# Patient Record
Sex: FEMALE | Race: ASIAN | URBAN - METROPOLITAN AREA
[De-identification: names, ages, dates, MRNs, and addresses within clinical notes are randomized per-mention and may not be internally consistent; named-entity substitution may affect disease eponyms.]

---

## 2017-04-08 ENCOUNTER — EMERGENCY (EMERGENCY)
Facility: HOSPITAL | Age: 37
LOS: 1 days | Discharge: HOME | End: 2017-04-08
Attending: PERSONAL EMERGENCY RESPONSE ATTENDANT | Admitting: PERSONAL EMERGENCY RESPONSE ATTENDANT
Payer: COMMERCIAL

## 2017-04-08 VITALS
HEART RATE: 74 BPM | RESPIRATION RATE: 18 BRPM | OXYGEN SATURATION: 100 % | DIASTOLIC BLOOD PRESSURE: 84 MMHG | SYSTOLIC BLOOD PRESSURE: 143 MMHG | TEMPERATURE: 98 F

## 2017-04-08 DIAGNOSIS — Z98.891 HISTORY OF UTERINE SCAR FROM PREVIOUS SURGERY: Chronic | ICD-10-CM

## 2017-04-08 DIAGNOSIS — R51 HEADACHE: ICD-10-CM

## 2017-04-08 DIAGNOSIS — Z98.890 OTHER SPECIFIED POSTPROCEDURAL STATES: ICD-10-CM

## 2017-04-08 LAB
ALBUMIN SERPL ELPH-MCNC: 4.7 G/DL — SIGNIFICANT CHANGE UP (ref 3.3–5)
ALP SERPL-CCNC: 51 U/L — SIGNIFICANT CHANGE UP (ref 40–120)
ALT FLD-CCNC: 15 U/L RC — SIGNIFICANT CHANGE UP (ref 10–45)
ANION GAP SERPL CALC-SCNC: 15 MMOL/L — SIGNIFICANT CHANGE UP (ref 5–17)
APTT BLD: 36.1 SEC — SIGNIFICANT CHANGE UP (ref 27.5–37.4)
AST SERPL-CCNC: 17 U/L — SIGNIFICANT CHANGE UP (ref 10–40)
BASOPHILS # BLD AUTO: 0.1 K/UL — SIGNIFICANT CHANGE UP (ref 0–0.2)
BASOPHILS NFR BLD AUTO: 0.8 % — SIGNIFICANT CHANGE UP (ref 0–2)
BILIRUB SERPL-MCNC: 0.4 MG/DL — SIGNIFICANT CHANGE UP (ref 0.2–1.2)
BUN SERPL-MCNC: 11 MG/DL — SIGNIFICANT CHANGE UP (ref 7–23)
CALCIUM SERPL-MCNC: 9.4 MG/DL — SIGNIFICANT CHANGE UP (ref 8.4–10.5)
CHLORIDE SERPL-SCNC: 104 MMOL/L — SIGNIFICANT CHANGE UP (ref 96–108)
CO2 SERPL-SCNC: 24 MMOL/L — SIGNIFICANT CHANGE UP (ref 22–31)
CREAT SERPL-MCNC: 0.84 MG/DL — SIGNIFICANT CHANGE UP (ref 0.5–1.3)
EOSINOPHIL # BLD AUTO: 0.1 K/UL — SIGNIFICANT CHANGE UP (ref 0–0.5)
EOSINOPHIL NFR BLD AUTO: 1.1 % — SIGNIFICANT CHANGE UP (ref 0–6)
GLUCOSE SERPL-MCNC: 57 MG/DL — LOW (ref 70–99)
HCT VFR BLD CALC: 38.4 % — SIGNIFICANT CHANGE UP (ref 34.5–45)
HGB BLD-MCNC: 12.9 G/DL — SIGNIFICANT CHANGE UP (ref 11.5–15.5)
INR BLD: 1.03 RATIO — SIGNIFICANT CHANGE UP (ref 0.88–1.16)
LYMPHOCYTES # BLD AUTO: 1.4 K/UL — SIGNIFICANT CHANGE UP (ref 1–3.3)
LYMPHOCYTES # BLD AUTO: 16.4 % — SIGNIFICANT CHANGE UP (ref 13–44)
MCHC RBC-ENTMCNC: 29.2 PG — SIGNIFICANT CHANGE UP (ref 27–34)
MCHC RBC-ENTMCNC: 33.5 GM/DL — SIGNIFICANT CHANGE UP (ref 32–36)
MCV RBC AUTO: 87.1 FL — SIGNIFICANT CHANGE UP (ref 80–100)
MONOCYTES # BLD AUTO: 0.4 K/UL — SIGNIFICANT CHANGE UP (ref 0–0.9)
MONOCYTES NFR BLD AUTO: 4.1 % — SIGNIFICANT CHANGE UP (ref 2–14)
NEUTROPHILS # BLD AUTO: 6.8 K/UL — SIGNIFICANT CHANGE UP (ref 1.8–7.4)
NEUTROPHILS NFR BLD AUTO: 77.6 % — HIGH (ref 43–77)
PLATELET # BLD AUTO: 289 K/UL — SIGNIFICANT CHANGE UP (ref 150–400)
POTASSIUM SERPL-MCNC: 4.1 MMOL/L — SIGNIFICANT CHANGE UP (ref 3.5–5.3)
POTASSIUM SERPL-SCNC: 4.1 MMOL/L — SIGNIFICANT CHANGE UP (ref 3.5–5.3)
PROT SERPL-MCNC: 7.7 G/DL — SIGNIFICANT CHANGE UP (ref 6–8.3)
PROTHROM AB SERPL-ACNC: 11.2 SEC — SIGNIFICANT CHANGE UP (ref 9.8–12.7)
RBC # BLD: 4.42 M/UL — SIGNIFICANT CHANGE UP (ref 3.8–5.2)
RBC # FLD: 12.2 % — SIGNIFICANT CHANGE UP (ref 10.3–14.5)
SODIUM SERPL-SCNC: 143 MMOL/L — SIGNIFICANT CHANGE UP (ref 135–145)
WBC # BLD: 8.8 K/UL — SIGNIFICANT CHANGE UP (ref 3.8–10.5)
WBC # FLD AUTO: 8.8 K/UL — SIGNIFICANT CHANGE UP (ref 3.8–10.5)

## 2017-04-08 PROCEDURE — 70549 MR ANGIOGRAPH NECK W/O&W/DYE: CPT | Mod: 26

## 2017-04-08 PROCEDURE — 99220: CPT

## 2017-04-08 PROCEDURE — 70551 MRI BRAIN STEM W/O DYE: CPT | Mod: 26

## 2017-04-08 PROCEDURE — 70498 CT ANGIOGRAPHY NECK: CPT | Mod: 26

## 2017-04-08 PROCEDURE — 70496 CT ANGIOGRAPHY HEAD: CPT | Mod: 26

## 2017-04-08 RX ORDER — ACETAMINOPHEN 500 MG
1000 TABLET ORAL ONCE
Qty: 0 | Refills: 0 | Status: COMPLETED | OUTPATIENT
Start: 2017-04-08 | End: 2017-04-08

## 2017-04-08 RX ORDER — KETOROLAC TROMETHAMINE 30 MG/ML
30 SYRINGE (ML) INJECTION ONCE
Qty: 0 | Refills: 0 | Status: DISCONTINUED | OUTPATIENT
Start: 2017-04-08 | End: 2017-04-08

## 2017-04-08 RX ORDER — METOCLOPRAMIDE HCL 10 MG
10 TABLET ORAL ONCE
Qty: 0 | Refills: 0 | Status: COMPLETED | OUTPATIENT
Start: 2017-04-08 | End: 2017-04-08

## 2017-04-08 RX ORDER — SODIUM CHLORIDE 9 MG/ML
3 INJECTION INTRAMUSCULAR; INTRAVENOUS; SUBCUTANEOUS EVERY 12 HOURS
Qty: 0 | Refills: 0 | Status: DISCONTINUED | OUTPATIENT
Start: 2017-04-08 | End: 2017-04-12

## 2017-04-08 RX ORDER — SODIUM CHLORIDE 9 MG/ML
1000 INJECTION INTRAMUSCULAR; INTRAVENOUS; SUBCUTANEOUS ONCE
Qty: 0 | Refills: 0 | Status: COMPLETED | OUTPATIENT
Start: 2017-04-08 | End: 2017-04-08

## 2017-04-08 RX ORDER — MAGNESIUM SULFATE 500 MG/ML
2 VIAL (ML) INJECTION ONCE
Qty: 0 | Refills: 0 | Status: COMPLETED | OUTPATIENT
Start: 2017-04-08 | End: 2017-04-08

## 2017-04-08 RX ADMIN — Medication 30 MILLIGRAM(S): at 16:39

## 2017-04-08 RX ADMIN — Medication 1000 MILLIGRAM(S): at 16:39

## 2017-04-08 RX ADMIN — Medication 400 MILLIGRAM(S): at 16:38

## 2017-04-08 RX ADMIN — Medication 50 GRAM(S): at 16:38

## 2017-04-08 RX ADMIN — Medication 10 MILLIGRAM(S): at 13:15

## 2017-04-08 RX ADMIN — SODIUM CHLORIDE 2000 MILLILITER(S): 9 INJECTION INTRAMUSCULAR; INTRAVENOUS; SUBCUTANEOUS at 13:14

## 2017-04-08 NOTE — ED PROVIDER NOTE - NOTES
Called re: stenosis and report no clear surg intervention at this time.  Recommending neuro consult and will see pt if neuro identifies surg need.

## 2017-04-08 NOTE — ED CDU PROVIDER NOTE - DETAILS
Headache  -frequent reevaluations  -neuro checks q4h  -MRI  -Neuro following  -Case d/w Dr. Hernández

## 2017-04-08 NOTE — ED PROVIDER NOTE - CARE PLAN
Principal Discharge DX:	Headache, unspecified headache type Principal Discharge DX:	Headache, unspecified headache type  Instructions for follow-up, activity and diet:	1) Please follow-up with your Primary Medical Doctor in 3-5 days. If you need to find a new physician, please call [phone]. See Neurology at [phone]  2) Return to the Emergency Department if you experiences: fevers, chills, headache, paralysis, or symptoms that are new or recurrent.  3) If you have any questions or concerns, do not hesitate to contact us at [phone].  4) To alleviate the pain, please rest and take Tylenol 650 mg or Motrin 400 mg once every 6 hours as needed. Principal Discharge DX:	Headache, unspecified headache type  Instructions for follow-up, activity and diet:	1) Please follow-up with your Primary Medical Doctor in 3-5 days. If you need to find a new physician, please call [phone]. See Neurology at [phone]  2) Return to the Emergency Department if you experiences: fevers, chills, headache, paralysis, or symptoms that are new or recurrent.  3) If you have any questions or concerns, do not hesitate to contact us at [phone].  4) To alleviate the pain, please rest and take Tylenol 650 mg or Motrin 400 mg once every 6 hours as needed. Principal Discharge DX:	Headache, unspecified headache type  Instructions for follow-up, activity and diet:	1) Please follow-up with your Primary Medical Doctor in 3-5 days. If you need to find a new physician, please call [phone]. See Neurology at [phone]  2) Return to the Emergency Department if you experiences: fevers, chills, headache, paralysis, or symptoms that are new or recurrent.  3) If you have any questions or concerns, do not hesitate to contact us at [phone].  4) To alleviate the pain, please rest and take Tylenol 650 mg or Motrin 400 mg once every 6 hours as needed. Principal Discharge DX:	Headache, unspecified headache type  Instructions for follow-up, activity and diet:	1) Please follow-up with your Primary Medical Doctor in 3-5 days. If you need to find a new physician, please call [phone]. See Neurology at [phone]  2) Return to the Emergency Department if you experiences: fevers, chills, headache, paralysis, or symptoms that are new or recurrent.  3) If you have any questions or concerns, do not hesitate to contact us at [phone].  4) To alleviate the pain, please rest and take Tylenol 650 mg or Motrin 400 mg once every 6 hours as needed. Principal Discharge DX:	Headache, unspecified headache type  Instructions for follow-up, activity and diet:	1) Please follow-up with your Primary Medical Doctor in 3-5 days. If you need to find a new physician, please call [phone]. See Neurology at [phone]  2) Return to the Emergency Department if you experiences: fevers, chills, headache, paralysis, or symptoms that are new or recurrent.  3) If you have any questions or concerns, do not hesitate to contact us at [phone].  4) To alleviate the pain, please rest and take Tylenol 650 mg or Motrin 400 mg once every 6 hours as needed.

## 2017-04-08 NOTE — ED CDU PROVIDER NOTE - MEDICAL DECISION MAKING DETAILS
Pt wit recurrent headaches worse today following exercise non focal neuro exam CDU obs re eval--Hernández

## 2017-04-08 NOTE — ED PROVIDER NOTE - MEDICAL DECISION MAKING DETAILS
ARMY:  Given lack of thunderclap headache/infectious sxs unlikely SAH/meningitis.  Waxing and waning throbbing h/a of new onset concerning for aneurysm vs. new onset migraines.  Discussed imaging with radiology and plan to CTA head and neck and give neuro follow-up if no aneurysm idenitifed.  Neurologically intact.  Pt stable and in no acute distress.  Hx obtained from pt.

## 2017-04-08 NOTE — ED PROVIDER NOTE - PLAN OF CARE
1) Please follow-up with your Primary Medical Doctor in 3-5 days. If you need to find a new physician, please call [phone]. See Neurology at [phone]  2) Return to the Emergency Department if you experiences: fevers, chills, headache, paralysis, or symptoms that are new or recurrent.  3) If you have any questions or concerns, do not hesitate to contact us at [phone].  4) To alleviate the pain, please rest and take Tylenol 650 mg or Motrin 400 mg once every 6 hours as needed.

## 2017-04-08 NOTE — ED ADULT NURSE NOTE - OBJECTIVE STATEMENT
35 y/o female presents to the ED ambulatory with . A&Ox4. C/O intermittent throbbing headache. Pt. states headache started one week ago with exertion. Pt. reports light exposure worsens the headache and it is relieved by laying down in a quiet area. Pt. denies change in LOC, dizziness, chest pain, SOB, changes in vision, cough, changes in urination/BM, and fever. 35 y/o female presents to the ED ambulatory with . A&Ox4. C/O intermittent throbbing headache. Pt. states headache started one week ago with exertion. Pt. reports light exposure worsens the headache and it is relieved by laying down in a quiet area. Went to urgent care this morning and advised pt to come into ED to r/o aneurysm. Pt. denies change in LOC, dizziness, chest pain, SOB, changes in vision, cough, changes in urination/BM, and fever.

## 2017-04-08 NOTE — ED ADULT NURSE NOTE - EENT WDL
Eyes with no visual disturbances.  Ears clean and dry and no hearing difficulties. Nose with pink mucosa and no drainage. No tenderness or swelling to throat or neck.

## 2017-04-08 NOTE — ED PROVIDER NOTE - PROGRESS NOTE DETAILS
Boogie Mcgee MD (resident): pain improved to 2 out of 10, neuro in tact, awaiting CT results Pt with HA has a narrowed vertebral artery neuro done will do anMRI  CDU OBS  [facility]NER Pt with HA has a narrowed vertebral artery neuro done will do anMRI  CDU OBS -- [facility]NER

## 2017-04-08 NOTE — ED CDU PROVIDER NOTE - ATTENDING CONTRIBUTION TO CARE
I have seen and evaluated this patient with the advance practice clinician.   I agree with the findings  unless other wise stated. I have amended notes where needed.  After my face to face bedside evaluation, I am noting: Pt wit recurrent headaches worse today following exercise non focal neuro exam CDU obs re diana--Edgar

## 2017-04-08 NOTE — ED PROVIDER NOTE - OBJECTIVE STATEMENT
Pt is an otherwise healthy 36 yr old female presenting to ED with throbbing h/a that began ~1 wk ago following exercise.  Pt denies thunderclap headache but states that she has no migraine/h/a hx and had onset of sig discomfort that built to throbbing assoc with photophobia, phonophobia.  H/a improved and pt exercised next day following which had same sxs.  SInce that time she's had intermittent headache worsened with bright light and sound.  THis morning awoke and had mild headache that built since waking.  She is photophobic on arrival.  No vomiting.  Neurologically intact.  No fevers/chills/neck stiffness.  No hx intracranial injury/path.  PT does not believe she could be pregnant.  VS's stable.  Sent to ED for imaging because of concern for aneurysm.

## 2017-04-08 NOTE — ED CDU PROVIDER NOTE - PROGRESS NOTE DETAILS
Pt just left unit for MRI.  BS Spoke with radiology resident, mild narrowing of right vertebral artery but no dissection.  Discussed results with pt, she will follow up with dr Johnson as an out patient and will start prednisone.  JORJE Dr. Kenney Note: I have personally performed a face to face diagnostic evaluation on this patient.  I have reviewed the ACP note and agree with the history, exam, and plan of care, except as noted.  History and Exam by me shows pt with headache with photosensitivity with concerns for VAD but negative on MRI.  Pt likely with migraine headache, will give prednisone 5d and excedrin OTC and stable for dc home.

## 2017-04-09 VITALS
TEMPERATURE: 98 F | HEART RATE: 60 BPM | SYSTOLIC BLOOD PRESSURE: 92 MMHG | RESPIRATION RATE: 16 BRPM | DIASTOLIC BLOOD PRESSURE: 62 MMHG | OXYGEN SATURATION: 98 %

## 2017-04-09 PROCEDURE — 80053 COMPREHEN METABOLIC PANEL: CPT

## 2017-04-09 PROCEDURE — 70498 CT ANGIOGRAPHY NECK: CPT

## 2017-04-09 PROCEDURE — 85610 PROTHROMBIN TIME: CPT

## 2017-04-09 PROCEDURE — G0378: CPT

## 2017-04-09 PROCEDURE — 70496 CT ANGIOGRAPHY HEAD: CPT

## 2017-04-09 PROCEDURE — 99284 EMERGENCY DEPT VISIT MOD MDM: CPT | Mod: 25

## 2017-04-09 PROCEDURE — 70549 MR ANGIOGRAPH NECK W/O&W/DYE: CPT

## 2017-04-09 PROCEDURE — 85027 COMPLETE CBC AUTOMATED: CPT

## 2017-04-09 PROCEDURE — 99217: CPT

## 2017-04-09 PROCEDURE — 70551 MRI BRAIN STEM W/O DYE: CPT

## 2017-04-09 PROCEDURE — A9585: CPT

## 2017-04-09 PROCEDURE — 96375 TX/PRO/DX INJ NEW DRUG ADDON: CPT

## 2017-04-09 PROCEDURE — 85730 THROMBOPLASTIN TIME PARTIAL: CPT

## 2017-04-09 PROCEDURE — 70544 MR ANGIOGRAPHY HEAD W/O DYE: CPT

## 2017-04-09 PROCEDURE — 96374 THER/PROPH/DIAG INJ IV PUSH: CPT

## 2017-04-09 RX ADMIN — Medication 50 MILLIGRAM(S): at 01:39

## 2017-04-09 NOTE — ED ADULT NURSE REASSESSMENT NOTE - NS ED NURSE REASSESS COMMENT FT1
Pt d/c from cdu as per provider. Pt VSS, IV cath removed. D/C instructions provided by Ricardo CRAWFORD. Pt free from harm at this time.
pt kaelyn oral intake Denies n/v
to cdu to have mri labs sent
Received pt from [facility] , received pt alert and responsive, oriented x4, denies any respiratory distress, SOB, or difficulty breathing. Pt transferred to CDU for observation for headache. Pt reports headache starting after working out for past week, worsening and unrelieved past day. Pt neuro intact. IV in place, patent and free of signs of infiltration, placed on continuos cardiac monitoring as ordered, NSR HR in the 60's. Pt educated on unit and unit rules, instructed patient to notify RN of any needed assistance, Pt verbalizes understanding, Call bell placed within reach. Safety maintained. Will continue to monitor.

## 2019-04-16 NOTE — ED CDU PROVIDER NOTE - PSH
"Requested Prescriptions   Pending Prescriptions Disp Refills     felodipine ER (PLENDIL) 2.5 MG 24 hr tablet [Pharmacy Med Name: FELODIPINE ER TABS 2.5MG]  Last Written Prescription Date:  05/09/2018  Last Fill Quantity: 90 tablet,  # refills: 3    Last office visit: 8/23/2018 with prescribing provider:  Cyn Crow MD        Future Office Visit:     90 tablet 3     Sig: TAKE 1 TABLET DAILY       Calcium Channel Blockers Protocol  Failed - 4/15/2019 11:30 AM        Failed - Normal serum creatinine on file in past 12 months     Recent Labs   Lab Test 09/05/18  1008   CR 1.86*             Passed - Blood pressure under 140/90 in past 12 months     BP Readings from Last 3 Encounters:   08/23/18 118/80   07/18/18 110/60   04/11/18 118/60                 Passed - Recent (12 mo) or future (30 days) visit within the authorizing provider's specialty     Patient had office visit in the last 12 months or has a visit in the next 30 days with authorizing provider or within the authorizing provider's specialty.  See \"Patient Info\" tab in inbasket, or \"Choose Columns\" in Meds & Orders section of the refill encounter.              Passed - Medication is active on med list        Passed - Patient is age 18 or older        Passed - No active pregnancy on record        Passed - No positive pregnancy test in past 12 months          " H/O  section

## 2019-05-29 NOTE — ED CLERICAL - BED REQUESTED
08-Apr-2017 19:00 5/25/19:  Evaluated by OT.  Bed mobility totalA.  No functional mobility 2/2 pain.    5/27/19:  Evaluated by PT.  Bed mobility max-totalA.  EOB x ~8 minutes max-totalA.  No further functional mobility or transfers 2/2 pain.  Refused OT 2/2 pain and just working with PT.   5/28/19:  Participated with OT.  Bed mobility maxA.  UBD maxA.   5/29/19:  Participated with PT and OT.  Bed mobility min-maxA.  Sit to stand and transfers modA with and without RW.  Ambulated 2 ft Memo (OT) and 6 steps modA with RW (PT).  Grooming SV while seated.

## 2020-02-28 NOTE — ED CDU PROVIDER NOTE - EYES, MLM
PATIENT GIVEN TYLENOL AND MOTRIN FOR TEMP 100.4 PRIOR TO DISCHARGE
Clear bilaterally, pupils equal, round and reactive to light.
